# Patient Record
Sex: MALE | Race: BLACK OR AFRICAN AMERICAN | Employment: OTHER | URBAN - METROPOLITAN AREA
[De-identification: names, ages, dates, MRNs, and addresses within clinical notes are randomized per-mention and may not be internally consistent; named-entity substitution may affect disease eponyms.]

---

## 2024-04-15 ENCOUNTER — HOSPITAL ENCOUNTER (EMERGENCY)
Facility: HOSPITAL | Age: 24
Discharge: HOME | End: 2024-04-15
Attending: EMERGENCY MEDICINE

## 2024-04-15 VITALS
TEMPERATURE: 99 F | DIASTOLIC BLOOD PRESSURE: 64 MMHG | WEIGHT: 210 LBS | BODY MASS INDEX: 39.65 KG/M2 | RESPIRATION RATE: 18 BRPM | SYSTOLIC BLOOD PRESSURE: 143 MMHG | HEART RATE: 77 BPM | HEIGHT: 61 IN | OXYGEN SATURATION: 98 %

## 2024-04-15 DIAGNOSIS — M79.10 MYALGIA: Primary | ICD-10-CM

## 2024-04-15 DIAGNOSIS — B34.9 VIRAL SYNDROME: ICD-10-CM

## 2024-04-15 LAB
ANION GAP SERPL CALC-SCNC: 3 MMOL/L (ref 0–18)
BASOPHILS # BLD AUTO: 0.03 X10(3) UL (ref 0–0.2)
BASOPHILS NFR BLD AUTO: 0.3 %
BILIRUB UR QL: NEGATIVE
BUN BLD-MCNC: 10 MG/DL (ref 9–23)
BUN/CREAT SERPL: 11.2 (ref 10–20)
CALCIUM BLD-MCNC: 9.6 MG/DL (ref 8.7–10.4)
CHLORIDE SERPL-SCNC: 109 MMOL/L (ref 98–112)
CK SERPL-CCNC: 221 U/L
CLARITY UR: CLEAR
CO2 SERPL-SCNC: 24 MMOL/L (ref 21–32)
CREAT BLD-MCNC: 0.89 MG/DL
DEPRECATED RDW RBC AUTO: 39.1 FL (ref 35.1–46.3)
EGFRCR SERPLBLD CKD-EPI 2021: 125 ML/MIN/1.73M2 (ref 60–?)
EOSINOPHIL # BLD AUTO: 0.6 X10(3) UL (ref 0–0.7)
EOSINOPHIL NFR BLD AUTO: 5.6 %
ERYTHROCYTE [DISTWIDTH] IN BLOOD BY AUTOMATED COUNT: 13 % (ref 11–15)
FLUAV + FLUBV RNA SPEC NAA+PROBE: NEGATIVE
FLUAV + FLUBV RNA SPEC NAA+PROBE: NEGATIVE
GLUCOSE BLD-MCNC: 98 MG/DL (ref 70–99)
GLUCOSE UR-MCNC: NORMAL MG/DL
HCT VFR BLD AUTO: 42.3 %
HGB BLD-MCNC: 14.3 G/DL
HGB UR QL STRIP.AUTO: NEGATIVE
IMM GRANULOCYTES # BLD AUTO: 0.02 X10(3) UL (ref 0–1)
IMM GRANULOCYTES NFR BLD: 0.2 %
KETONES UR-MCNC: NEGATIVE MG/DL
LEUKOCYTE ESTERASE UR QL STRIP.AUTO: 75
LYMPHOCYTES # BLD AUTO: 2.33 X10(3) UL (ref 1–4)
LYMPHOCYTES NFR BLD AUTO: 21.9 %
MCH RBC QN AUTO: 28.2 PG (ref 26–34)
MCHC RBC AUTO-ENTMCNC: 33.8 G/DL (ref 31–37)
MCV RBC AUTO: 83.4 FL
MONOCYTES # BLD AUTO: 0.93 X10(3) UL (ref 0.1–1)
MONOCYTES NFR BLD AUTO: 8.7 %
NEUTROPHILS # BLD AUTO: 6.74 X10 (3) UL (ref 1.5–7.7)
NEUTROPHILS # BLD AUTO: 6.74 X10(3) UL (ref 1.5–7.7)
NEUTROPHILS NFR BLD AUTO: 63.3 %
NITRITE UR QL STRIP.AUTO: NEGATIVE
OSMOLALITY SERPL CALC.SUM OF ELEC: 281 MOSM/KG (ref 275–295)
PH UR: 5.5 [PH] (ref 5–8)
PLATELET # BLD AUTO: 270 10(3)UL (ref 150–450)
POTASSIUM SERPL-SCNC: 4 MMOL/L (ref 3.5–5.1)
PROT UR-MCNC: NEGATIVE MG/DL
RBC # BLD AUTO: 5.07 X10(6)UL
RSV RNA SPEC NAA+PROBE: NEGATIVE
SARS-COV-2 RNA RESP QL NAA+PROBE: NOT DETECTED
SODIUM SERPL-SCNC: 136 MMOL/L (ref 136–145)
SP GR UR STRIP: 1.02 (ref 1–1.03)
UROBILINOGEN UR STRIP-ACNC: NORMAL
WBC # BLD AUTO: 10.7 X10(3) UL (ref 4–11)

## 2024-04-15 PROCEDURE — 0241U SARS-COV-2/FLU A AND B/RSV BY PCR (GENEXPERT): CPT | Performed by: EMERGENCY MEDICINE

## 2024-04-15 PROCEDURE — 99284 EMERGENCY DEPT VISIT MOD MDM: CPT

## 2024-04-15 PROCEDURE — 85025 COMPLETE CBC W/AUTO DIFF WBC: CPT | Performed by: EMERGENCY MEDICINE

## 2024-04-15 PROCEDURE — 96361 HYDRATE IV INFUSION ADD-ON: CPT

## 2024-04-15 PROCEDURE — 80048 BASIC METABOLIC PNL TOTAL CA: CPT | Performed by: EMERGENCY MEDICINE

## 2024-04-15 PROCEDURE — 0241U SARS-COV-2/FLU A AND B/RSV BY PCR (GENEXPERT): CPT

## 2024-04-15 PROCEDURE — 81001 URINALYSIS AUTO W/SCOPE: CPT | Performed by: EMERGENCY MEDICINE

## 2024-04-15 PROCEDURE — 96374 THER/PROPH/DIAG INJ IV PUSH: CPT

## 2024-04-15 PROCEDURE — 82550 ASSAY OF CK (CPK): CPT | Performed by: EMERGENCY MEDICINE

## 2024-04-15 RX ORDER — KETOROLAC TROMETHAMINE 10 MG/1
10 TABLET, FILM COATED ORAL EVERY 6 HOURS PRN
Qty: 15 TABLET | Refills: 0 | Status: SHIPPED | OUTPATIENT
Start: 2024-04-15 | End: 2024-04-22

## 2024-04-15 RX ORDER — KETOROLAC TROMETHAMINE 15 MG/ML
15 INJECTION, SOLUTION INTRAMUSCULAR; INTRAVENOUS ONCE
Status: COMPLETED | OUTPATIENT
Start: 2024-04-15 | End: 2024-04-15

## 2024-04-15 NOTE — ED INITIAL ASSESSMENT (HPI)
Pt presents to the ER with \"body pain\" x 2 days.    Denies cough, fever, chills, urinary symptoms.

## 2024-04-16 NOTE — ED PROVIDER NOTES
Patient Seen in: [facility] Emergency Department    History     Chief Complaint   Patient presents with    Body ache and/or chills     Stated Complaint: Body pain    HPI    Patient complains of diffuse myalgias and fatigue, no recent illness.  No focal joint pain or swelling.  Arms, legs back all aching.  No n/v no abd pain..    Alleviating factors: none  Exacerbating factors: none    History reviewed. No pertinent past medical history.    History reviewed. No pertinent surgical history.         No family history on file.    Social History     Socioeconomic History    Marital status: Single   Tobacco Use    Smoking status: Never     Passive exposure: Never    Smokeless tobacco: Never   Vaping Use    Vaping status: Never Used   Substance and Sexual Activity    Alcohol use: Yes     Comment: socially    Drug use: Yes     Frequency: 7.0 times per week     Types: Cannabis       Review of Systems    Positive for stated complaint: Body pain  Other systems are as noted in HPI.  Constitutional and vital signs reviewed.      All other systems reviewed and negative except as noted above.    PSFH elements reviewed from today and agreed except as otherwise stated in HPI.    Physical Exam     ED Triage Vitals [04/15/24 1035]   BP (!) 157/94   Pulse 88   Resp 18   Temp 99 °F (37.2 °C)   Temp src Oral   SpO2 99 %   O2 Device None (Room air)       Current:/64   Pulse 77   Temp 99 °F (37.2 °C) (Oral)   Resp 18   Ht 154.9 cm (5' 1\")   Wt 95.3 kg   SpO2 98%   BMI 39.68 kg/m²    PULSE OX nl  GENERAL: appears tired non toxic  HEAD: normocephalic, atraumatic,   EYES: PERRLA, EOMI, conj sclera clear  THROAT: mmm, no lesions  NECK: supple, no meningeal signs  LUNGS: no resp distress, cta bilateral  CARDIO: RRR without murmur  GI: abdomen is soft and non tender, no masses, nl bowel sounds   EXTREMITIES: from, 5/5 strength in all 4 ext, no edema, tender in all limbs no focal erythema or swelling no focal joint  swelling  NEURO: alert and oiented *3, 2-12 intact, no focal deficit noted  SKIN: good skin turgor, no  rashes  PSYCH: calm, cooperative,    Differential includes:myositis vs. Viral syndrome    ED Course     Labs Reviewed   URINALYSIS, ROUTINE - Abnormal; Notable for the following components:       Result Value    Leukocyte Esterase Urine 75 (*)     WBC Urine 11-20 (*)     Squamous Epi. Cells Few (*)     All other components within normal limits   CK CREATINE KINASE (NOT CREATININE) - Abnormal; Notable for the following components:     (*)     All other components within normal limits   BASIC METABOLIC PANEL (8) - Normal   SARS-COV-2/FLU A AND B/RSV BY PCR (GENEXPERT) - Normal    Narrative:     This test is intended for the qualitative detection and differentiation of SARS-CoV-2, influenza A, influenza B, and respiratory syncytial virus (RSV) viral RNA in nasopharyngeal or nares swabs from individuals suspected of respiratory viral infection consistent with COVID-19 by their healthcare provider. Signs and symptoms of respiratory viral infection due to SARS-CoV-2, influenza, and RSV can be similar.    Test performed using the Xpert Xpress SARS-CoV-2/FLU/RSV (real time RT-PCR)  assay on the Gene[employer]pert instrument, [employer], [place] [address].   This test is being used under the Food and Drug Administration's Emergency Use Authorization.    The authorized Fact Sheet for Healthcare Providers for this assay is available upon request from the laboratory.   CBC WITH DIFFERENTIAL WITH PLATELET    Narrative:     The following orders were created for panel order CBC With Differential With Platelet.  Procedure                               Abnormality         Status                     ---------                               -----------         ------                     CBC W/ DIFFERENTIAL[[id number]]                              Final result                 Please view results for these tests on the individual orders.    CBC W/ DIFFERENTIAL       MDM       Cardiac Monitor:   Pulse Readings from Last 1 Encounters:   04/15/24 77       Radiology findings:   No results found.        Medical Decision Making  Problems Addressed:  Myalgia: acute illness or injury     Details: Cpk min elevated  Viral syndrome: acute illness or injury    Amount and/or Complexity of Data Reviewed  Labs: ordered. Decision-making details documented in ED Course.  Discussion of management or test interpretation with external provider(s): Tylenol, motrin recommended.      Risk  OTC drugs.  Prescription drug management.        Disposition and Plan     Clinical Impression:  1. Myalgia    2. Viral syndrome        Disposition:  Discharge    Follow-up:  Geo Murray MD  [address] [facility] [address]  [facility] [address]  [phone]    Follow up        Medications Prescribed:  Discharge Medication List as of 4/15/2024  1:02 PM        START taking these medications    Details   Ketorolac Tromethamine 10 MG Oral Tab Take 1 tablet (10 mg total) by mouth every 6 (six) hours as needed for Pain., Print, Disp-15 tablet, R-0

## 2024-08-03 ENCOUNTER — HOSPITAL ENCOUNTER (EMERGENCY)
Facility: HOSPITAL | Age: 24
Discharge: HOME | End: 2024-08-03
Attending: EMERGENCY MEDICINE

## 2024-08-03 ENCOUNTER — APPOINTMENT (OUTPATIENT)
Dept: ULTRASOUND IMAGING | Facility: HOSPITAL | Age: 24
End: 2024-08-03
Attending: EMERGENCY MEDICINE

## 2024-08-03 VITALS
HEART RATE: 73 BPM | WEIGHT: 210 LBS | RESPIRATION RATE: 19 BRPM | DIASTOLIC BLOOD PRESSURE: 61 MMHG | OXYGEN SATURATION: 99 % | TEMPERATURE: 100 F | HEIGHT: 61 IN | BODY MASS INDEX: 39.65 KG/M2 | SYSTOLIC BLOOD PRESSURE: 125 MMHG

## 2024-08-03 DIAGNOSIS — N45.1 EPIDIDYMITIS: Primary | ICD-10-CM

## 2024-08-03 PROCEDURE — 93975 VASCULAR STUDY: CPT | Performed by: EMERGENCY MEDICINE

## 2024-08-03 PROCEDURE — 76870 US EXAM SCROTUM: CPT | Performed by: EMERGENCY MEDICINE

## 2024-08-03 PROCEDURE — 99284 EMERGENCY DEPT VISIT MOD MDM: CPT

## 2024-08-03 RX ORDER — IBUPROFEN 600 MG/1
600 TABLET ORAL ONCE
Status: COMPLETED | OUTPATIENT
Start: 2024-08-03 | End: 2024-08-03

## 2024-08-03 RX ORDER — KETOROLAC TROMETHAMINE 10 MG/1
10 TABLET, FILM COATED ORAL EVERY 6 HOURS PRN
Qty: 15 TABLET | Refills: 0 | Status: SHIPPED | OUTPATIENT
Start: 2024-08-03 | End: 2024-08-10

## 2024-08-03 RX ORDER — ACETAMINOPHEN 500 MG
1000 TABLET ORAL ONCE
Status: COMPLETED | OUTPATIENT
Start: 2024-08-03 | End: 2024-08-03

## 2024-08-03 RX ORDER — DOXYCYCLINE HYCLATE 100 MG/1
100 CAPSULE ORAL 2 TIMES DAILY
Qty: 14 CAPSULE | Refills: 0 | Status: SHIPPED | OUTPATIENT
Start: 2024-08-03 | End: 2024-08-10

## 2024-08-04 NOTE — ED PROVIDER NOTES
Patient Seen in: [facility] Emergency Department    History     Chief Complaint   Patient presents with    Eval-G     Stated Complaint: hip pain    HPI    Patient complains of r testicular pain and swelling for 3 days.  No fever. No urinary symptoms no penile discharge..    Alleviating factors: rest  Exacerbating factors: walking    History reviewed. No pertinent past medical history.    History reviewed. No pertinent surgical history.         No family history on file.    Social History     Socioeconomic History    Marital status: Single   Tobacco Use    Smoking status: Never     Passive exposure: Never    Smokeless tobacco: Never   Vaping Use    Vaping status: Never Used   Substance and Sexual Activity    Alcohol use: Yes     Comment: socially    Drug use: Yes     Frequency: 21.0 times per week     Types: Cannabis       Review of Systems    Positive for stated complaint: hip pain  Other systems are as noted in HPI.  Constitutional and vital signs reviewed.      All other systems reviewed and negative except as noted above.    PSFH elements reviewed from today and agreed except as otherwise stated in HPI.    Physical Exam     ED Triage Vitals [08/03/24 2019]   /70   Pulse 80   Resp 19   Temp 100.3 °F (37.9 °C)   Temp src Oral   SpO2 99 %   O2 Device None (Room air)       Current:/70   Pulse 80   Temp 100.3 °F (37.9 °C) (Oral)   Resp 19   Ht 154.9 cm (5' 1\")   Wt 95.3 kg   SpO2 99%   BMI 39.68 kg/m²      GENERAL: awake alert no distress  HEAD: normocephalic, atraumatic,   EYES: PERRLA, EOMI, conj sclera clear  THROAT: mmm, no lesions  NECK: supple, no meningeal signs  LUNGS: no resp distress,   GI: abdomen is soft and non tender, no masses, nl bowel sounds   : r scrotal tenderness and swelling  EXTREMITIES: from, 5/5 strength in all 4 ext, no edema  NEURO: alert and oiented *3, 2-12 intact, no focal deficit noted  SKIN: good skin turgor, no  rashes  PSYCH: calm,  cooperative,    Differential includes:epididymitis vs. Torsion vs. hydrocele    ED Course     Labs Reviewed   URINALYSIS WITH CULTURE REFLEX       MDM       Cardiac Monitor:   Pulse Readings from Last 1 Encounters:   08/03/24 80   , ,      Radiology findings: No results found.    Us-epidiymitis    Medical Decision Making  Problems Addressed:  Epididymitis: acute illness or injury    Amount and/or Complexity of Data Reviewed  Discussion of management or test interpretation with external provider(s): Tylenol, motrin recommended.      Risk  OTC drugs.  Prescription drug management.        Disposition and Plan     Clinical Impression:  1. Epididymitis        Disposition:  Discharge    Follow-up:  Odalis Thomas MD  [address] [address]  [phone]    Follow up        Medications Prescribed:  Current Discharge Medication List        START taking these medications    Details   doxycycline 100 MG Oral Cap Take 1 capsule (100 mg total) by mouth 2 (two) times daily for 7 days.  Qty: 14 capsule, Refills: 0      Ketorolac Tromethamine 10 MG Oral Tab Take 1 tablet (10 mg total) by mouth every 6 (six) hours as needed for Pain.  Qty: 15 tablet, Refills: 0

## 2024-08-04 NOTE — ED INITIAL ASSESSMENT (HPI)
Pt presents for right sided testicular swelling since Wednesday, denies redness to right testicle. Pt reports pain now radiates up to right up. Pt denies fevers at home, 100.3 in triage. Pt denies urinary complaints.

## (undated) NOTE — LETTER
Date & Time: 8/3/2024, 11:34 PM  Patient: Jaime Underwood  Encounter Provider(s):    Garrett Lozano MD       To Whom It May Concern:    Jaime Underwood was seen and treated in our department on 8/3/2024. He is on light duty next 5 days, min squatting, twisting or lifting over 25 pounds..    If you have any questions or concerns, please do not hesitate to call.        _____________________________  Physician/APC Signature